# Patient Record
Sex: FEMALE | Race: WHITE | Employment: FULL TIME | URBAN - METROPOLITAN AREA
[De-identification: names, ages, dates, MRNs, and addresses within clinical notes are randomized per-mention and may not be internally consistent; named-entity substitution may affect disease eponyms.]

---

## 2022-10-06 ENCOUNTER — APPOINTMENT (OUTPATIENT)
Dept: GENERAL RADIOLOGY | Age: 57
End: 2022-10-06
Payer: COMMERCIAL

## 2022-10-06 ENCOUNTER — APPOINTMENT (OUTPATIENT)
Dept: MRI IMAGING | Age: 57
End: 2022-10-06
Payer: COMMERCIAL

## 2022-10-06 ENCOUNTER — APPOINTMENT (OUTPATIENT)
Dept: CT IMAGING | Age: 57
End: 2022-10-06
Payer: COMMERCIAL

## 2022-10-06 ENCOUNTER — HOSPITAL ENCOUNTER (OUTPATIENT)
Age: 57
Setting detail: OBSERVATION
Discharge: HOME | End: 2022-10-07
Attending: EMERGENCY MEDICINE | Admitting: STUDENT IN AN ORGANIZED HEALTH CARE EDUCATION/TRAINING PROGRAM
Payer: COMMERCIAL

## 2022-10-06 DIAGNOSIS — H54.7 VISUAL LOSS: Primary | ICD-10-CM

## 2022-10-06 DIAGNOSIS — G45.9 TIA (TRANSIENT ISCHEMIC ATTACK): ICD-10-CM

## 2022-10-06 PROBLEM — F41.9 ANXIETY: Status: ACTIVE | Noted: 2019-03-14

## 2022-10-06 PROBLEM — I10 ESSENTIAL HYPERTENSION: Status: ACTIVE | Noted: 2019-03-14

## 2022-10-06 PROBLEM — E03.9 ACQUIRED HYPOTHYROIDISM: Status: ACTIVE | Noted: 2019-03-14

## 2022-10-06 LAB
ABSOLUTE EOS #: 0.2 K/UL (ref 0–0.4)
ABSOLUTE LYMPH #: 1.9 K/UL (ref 1–4.8)
ABSOLUTE MONO #: 0.5 K/UL (ref 0.1–1.2)
ANION GAP SERPL CALCULATED.3IONS-SCNC: 12 MMOL/L (ref 9–17)
BASOPHILS # BLD: 2 % (ref 0–2)
BASOPHILS ABSOLUTE: 0.1 K/UL (ref 0–0.2)
BUN BLDV-MCNC: 18 MG/DL (ref 6–20)
CALCIUM SERPL-MCNC: 9.9 MG/DL (ref 8.6–10.4)
CHLORIDE BLD-SCNC: 98 MMOL/L (ref 98–107)
CO2: 28 MMOL/L (ref 20–31)
CREAT SERPL-MCNC: 0.69 MG/DL (ref 0.5–0.9)
EOSINOPHILS RELATIVE PERCENT: 3 % (ref 1–4)
GFR SERPL CREATININE-BSD FRML MDRD: >60 ML/MIN/1.73M2
GLUCOSE BLD-MCNC: 86 MG/DL (ref 65–105)
GLUCOSE BLD-MCNC: 93 MG/DL (ref 70–99)
HCT VFR BLD CALC: 41.8 % (ref 36–46)
HEMOGLOBIN: 13.9 G/DL (ref 12–16)
LYMPHOCYTES # BLD: 26 % (ref 24–44)
MCH RBC QN AUTO: 29.1 PG (ref 26–34)
MCHC RBC AUTO-ENTMCNC: 33.1 G/DL (ref 31–37)
MCV RBC AUTO: 87.9 FL (ref 80–100)
MONOCYTES # BLD: 7 % (ref 2–11)
PDW BLD-RTO: 12.5 % (ref 12.5–15.4)
PLATELET # BLD: 252 K/UL (ref 140–450)
PMV BLD AUTO: 9.5 FL (ref 6–12)
POTASSIUM SERPL-SCNC: 4.1 MMOL/L (ref 3.7–5.3)
RBC # BLD: 4.76 M/UL (ref 4–5.2)
SEG NEUTROPHILS: 62 % (ref 36–66)
SEGMENTED NEUTROPHILS ABSOLUTE COUNT: 4.6 K/UL (ref 1.8–7.7)
SODIUM BLD-SCNC: 138 MMOL/L (ref 135–144)
WBC # BLD: 7.4 K/UL (ref 3.5–11)

## 2022-10-06 PROCEDURE — G0378 HOSPITAL OBSERVATION PER HR: HCPCS

## 2022-10-06 PROCEDURE — 82947 ASSAY GLUCOSE BLOOD QUANT: CPT

## 2022-10-06 PROCEDURE — 2580000003 HC RX 258: Performed by: EMERGENCY MEDICINE

## 2022-10-06 PROCEDURE — 36415 COLL VENOUS BLD VENIPUNCTURE: CPT

## 2022-10-06 PROCEDURE — 6370000000 HC RX 637 (ALT 250 FOR IP): Performed by: STUDENT IN AN ORGANIZED HEALTH CARE EDUCATION/TRAINING PROGRAM

## 2022-10-06 PROCEDURE — 70450 CT HEAD/BRAIN W/O DYE: CPT

## 2022-10-06 PROCEDURE — 2580000003 HC RX 258: Performed by: STUDENT IN AN ORGANIZED HEALTH CARE EDUCATION/TRAINING PROGRAM

## 2022-10-06 PROCEDURE — 93005 ELECTROCARDIOGRAM TRACING: CPT | Performed by: EMERGENCY MEDICINE

## 2022-10-06 PROCEDURE — 80048 BASIC METABOLIC PNL TOTAL CA: CPT

## 2022-10-06 PROCEDURE — 96372 THER/PROPH/DIAG INJ SC/IM: CPT

## 2022-10-06 PROCEDURE — 6360000002 HC RX W HCPCS: Performed by: STUDENT IN AN ORGANIZED HEALTH CARE EDUCATION/TRAINING PROGRAM

## 2022-10-06 PROCEDURE — 99285 EMERGENCY DEPT VISIT HI MDM: CPT

## 2022-10-06 PROCEDURE — 99219 PR INITIAL OBSERVATION CARE/DAY 50 MINUTES: CPT | Performed by: STUDENT IN AN ORGANIZED HEALTH CARE EDUCATION/TRAINING PROGRAM

## 2022-10-06 PROCEDURE — 71045 X-RAY EXAM CHEST 1 VIEW: CPT

## 2022-10-06 PROCEDURE — 6370000000 HC RX 637 (ALT 250 FOR IP): Performed by: EMERGENCY MEDICINE

## 2022-10-06 PROCEDURE — 85025 COMPLETE CBC W/AUTO DIFF WBC: CPT

## 2022-10-06 PROCEDURE — 70496 CT ANGIOGRAPHY HEAD: CPT

## 2022-10-06 PROCEDURE — 6360000004 HC RX CONTRAST MEDICATION: Performed by: EMERGENCY MEDICINE

## 2022-10-06 PROCEDURE — 70551 MRI BRAIN STEM W/O DYE: CPT

## 2022-10-06 RX ORDER — LEVOTHYROXINE SODIUM 0.12 MG/1
125 TABLET ORAL DAILY
Status: DISCONTINUED | OUTPATIENT
Start: 2022-10-07 | End: 2022-10-07 | Stop reason: HOSPADM

## 2022-10-06 RX ORDER — LOSARTAN POTASSIUM 50 MG/1
100 TABLET ORAL DAILY
Status: DISCONTINUED | OUTPATIENT
Start: 2022-10-07 | End: 2022-10-07 | Stop reason: HOSPADM

## 2022-10-06 RX ORDER — SODIUM CHLORIDE 9 MG/ML
INJECTION, SOLUTION INTRAVENOUS PRN
Status: DISCONTINUED | OUTPATIENT
Start: 2022-10-06 | End: 2022-10-07 | Stop reason: HOSPADM

## 2022-10-06 RX ORDER — ASPIRIN 81 MG/1
324 TABLET, CHEWABLE ORAL ONCE
Status: COMPLETED | OUTPATIENT
Start: 2022-10-06 | End: 2022-10-06

## 2022-10-06 RX ORDER — ACETAMINOPHEN 325 MG/1
650 TABLET ORAL EVERY 6 HOURS PRN
Status: DISCONTINUED | OUTPATIENT
Start: 2022-10-06 | End: 2022-10-07 | Stop reason: HOSPADM

## 2022-10-06 RX ORDER — LEVOTHYROXINE SODIUM 0.12 MG/1
125 TABLET ORAL DAILY
COMMUNITY

## 2022-10-06 RX ORDER — ATORVASTATIN CALCIUM 40 MG/1
40 TABLET, FILM COATED ORAL NIGHTLY
Status: DISCONTINUED | OUTPATIENT
Start: 2022-10-06 | End: 2022-10-07 | Stop reason: HOSPADM

## 2022-10-06 RX ORDER — LOSARTAN POTASSIUM AND HYDROCHLOROTHIAZIDE 25; 100 MG/1; MG/1
1 TABLET ORAL DAILY
COMMUNITY

## 2022-10-06 RX ORDER — POLYETHYLENE GLYCOL 3350 17 G/17G
17 POWDER, FOR SOLUTION ORAL DAILY PRN
Status: DISCONTINUED | OUTPATIENT
Start: 2022-10-06 | End: 2022-10-07 | Stop reason: HOSPADM

## 2022-10-06 RX ORDER — ONDANSETRON 2 MG/ML
4 INJECTION INTRAMUSCULAR; INTRAVENOUS EVERY 6 HOURS PRN
Status: DISCONTINUED | OUTPATIENT
Start: 2022-10-06 | End: 2022-10-07 | Stop reason: HOSPADM

## 2022-10-06 RX ORDER — ONDANSETRON 4 MG/1
4 TABLET, ORALLY DISINTEGRATING ORAL EVERY 8 HOURS PRN
Status: DISCONTINUED | OUTPATIENT
Start: 2022-10-06 | End: 2022-10-07 | Stop reason: HOSPADM

## 2022-10-06 RX ORDER — SODIUM CHLORIDE 0.9 % (FLUSH) 0.9 %
10 SYRINGE (ML) INJECTION PRN
Status: DISCONTINUED | OUTPATIENT
Start: 2022-10-06 | End: 2022-10-07 | Stop reason: HOSPADM

## 2022-10-06 RX ORDER — LOSARTAN POTASSIUM 50 MG/1
100 TABLET ORAL DAILY
Status: DISCONTINUED | OUTPATIENT
Start: 2022-10-07 | End: 2022-10-06

## 2022-10-06 RX ORDER — LOSARTAN POTASSIUM 100 MG/1
TABLET ORAL DAILY
Status: ON HOLD | COMMUNITY
End: 2022-10-06

## 2022-10-06 RX ORDER — CLOPIDOGREL BISULFATE 75 MG/1
75 TABLET ORAL DAILY
Status: DISCONTINUED | OUTPATIENT
Start: 2022-10-06 | End: 2022-10-07 | Stop reason: HOSPADM

## 2022-10-06 RX ORDER — HYDROCHLOROTHIAZIDE 25 MG/1
25 TABLET ORAL DAILY
Status: DISCONTINUED | OUTPATIENT
Start: 2022-10-07 | End: 2022-10-07 | Stop reason: HOSPADM

## 2022-10-06 RX ORDER — 0.9 % SODIUM CHLORIDE 0.9 %
80 INTRAVENOUS SOLUTION INTRAVENOUS ONCE
Status: DISCONTINUED | OUTPATIENT
Start: 2022-10-06 | End: 2022-10-07 | Stop reason: HOSPADM

## 2022-10-06 RX ORDER — SODIUM CHLORIDE 0.9 % (FLUSH) 0.9 %
5-40 SYRINGE (ML) INJECTION EVERY 12 HOURS SCHEDULED
Status: DISCONTINUED | OUTPATIENT
Start: 2022-10-06 | End: 2022-10-07 | Stop reason: HOSPADM

## 2022-10-06 RX ORDER — ENOXAPARIN SODIUM 100 MG/ML
40 INJECTION SUBCUTANEOUS DAILY
Status: DISCONTINUED | OUTPATIENT
Start: 2022-10-06 | End: 2022-10-07 | Stop reason: HOSPADM

## 2022-10-06 RX ORDER — SODIUM CHLORIDE 0.9 % (FLUSH) 0.9 %
5-40 SYRINGE (ML) INJECTION PRN
Status: DISCONTINUED | OUTPATIENT
Start: 2022-10-06 | End: 2022-10-07 | Stop reason: HOSPADM

## 2022-10-06 RX ADMIN — ATORVASTATIN CALCIUM 40 MG: 40 TABLET, FILM COATED ORAL at 21:51

## 2022-10-06 RX ADMIN — ENOXAPARIN SODIUM 40 MG: 100 INJECTION SUBCUTANEOUS at 15:29

## 2022-10-06 RX ADMIN — SODIUM CHLORIDE, PRESERVATIVE FREE 10 ML: 5 INJECTION INTRAVENOUS at 12:53

## 2022-10-06 RX ADMIN — SODIUM CHLORIDE, PRESERVATIVE FREE 10 ML: 5 INJECTION INTRAVENOUS at 21:51

## 2022-10-06 RX ADMIN — Medication 80 ML: at 12:54

## 2022-10-06 RX ADMIN — ASPIRIN 81 MG CHEWABLE TABLET 324 MG: 81 TABLET CHEWABLE at 14:36

## 2022-10-06 RX ADMIN — IOPAMIDOL 100 ML: 755 INJECTION, SOLUTION INTRAVENOUS at 12:53

## 2022-10-06 RX ADMIN — CLOPIDOGREL BISULFATE 75 MG: 75 TABLET ORAL at 15:29

## 2022-10-06 ASSESSMENT — PAIN DESCRIPTION - LOCATION: LOCATION: JAW

## 2022-10-06 ASSESSMENT — PAIN SCALES - GENERAL
PAINLEVEL_OUTOF10: 0
PAINLEVEL_OUTOF10: 0

## 2022-10-06 ASSESSMENT — PAIN - FUNCTIONAL ASSESSMENT: PAIN_FUNCTIONAL_ASSESSMENT: 0-10

## 2022-10-06 NOTE — ED TRIAGE NOTES
Vision issues started this morning. Called her brother who is optometrist and told her she could be having a stroke. Vision better at this time.

## 2022-10-06 NOTE — H&P
[facility]  Office: [address], DO, Tereso Staples, DO, Amara Cope, DO, [place] Matsgerri Blood, DO, Velton Habermann, MD, Dianna Lauren MD, Mikhail Costa MD, Marybeth Wynne MD,  Nalini Hein MD, Raymond Dewitt MD, Harmony Sanders, DO, Gracie Resendiz MD,  Micah Shah MD, Jade Fleming MD, Kandy Souza DO, Madhu Nicolas MD, Bertha Fraser MD, Astrid Gabriel MD, Kenny Bustillo MD, Samantha Barfield MD, Declan Machuca MD, Cas Light, DO, Bessy Manrique MD, Fish Grullon MD, Kadeem Carvalho, CNP,  Namita Swanson, CNP, Emely Alvarado, CNP, Jannette Hernandez, CNP,  Nelida Olsen, DNP, Nora Ward, CNP, Benjamin Mabry, CNP, Castro Coe, CNP, Mary Iniguez, CNP, Joanna Marie, CNP, Jalen Brown, PA-C, Dinora Aparicio, CNS, Juju Marie, DNP, Toni Drew, CNP, Franklin Landeros, CNP, Ting Garcia, [address]    HISTORY AND PHYSICAL EXAMINATION            Date:   10/6/2022  Patient name:  Ariel Frank  Date of admission:  10/6/2022 11:27 AM  MRN:   [id number]  Account:  [de-identified]  YOB: 1965  PCP:    No primary care provider on file. Room:   [address] [place] [address]  Code Status:    Full Code    Chief Complaint:     Visual disturbance     History Obtained From:     patient    History of Present Illness:     Ariel Frank is a 56 y.o. female with a past medical history of hypothyroidism and hypertension who presented to the emergency department on 10/6/2022 complaining of transient loss of vision in her left eye. The patient states that her symptoms began early this morning and resolved in the emergency department. The patient states that she started \"seeing grey dots\" in her left periphery this morning and then suddenly lost her peripheral vision in the affected eye. In the ED, the patient was afebrile and nontoxic appearing.  CT and CTA of the head and neck were negative for acute stroke or vascular abnormality. Neurology was consulted in the emergency department and recommended admission for MRI of the brain and inpatient neurology evaluation. The patient is admitted to internal medicine for further management. Past Medical History:     Past Medical History:   Diagnosis Date    Hypertension         Past Surgical History:     Past Surgical History:   Procedure Laterality Date    THYROID SURGERY          Medications Prior to Admission:     Prior to Admission medications    Medication Sig Start Date End Date Taking? Authorizing Provider   levothyroxine (SYNTHROID) 125 MCG tablet Take 125 mcg by mouth Daily   Yes Historical Provider, MD   losartan-hydroCHLOROthiazide (HYZAAR) 100-25 MG per tablet Take 1 tablet by mouth daily   Yes Historical Provider, MD        Allergies:     Patient has no known allergies. Social History:     Tobacco:    has no history on file for tobacco use. Alcohol:      has no history on file for alcohol use. Drug Use:  has no history on file for drug use. Family History:     No family history on file. Review of Systems:     Positive and Negative as described in HPI.     CONSTITUTIONAL:  negative for fevers, chills, sweats, fatigue, weight loss  HEENT:  negative for vision, hearing changes, runny nose, throat pain  RESPIRATORY:  negative for shortness of breath, cough, congestion, wheezing  CARDIOVASCULAR:  negative for chest pain, palpitations  GASTROINTESTINAL:  negative for nausea, vomiting, diarrhea, constipation, change in bowel habits, abdominal pain   GENITOURINARY:  negative for difficulty of urination, burning with urination, frequency   INTEGUMENT:  negative for rash, skin lesions, easy bruising   HEMATOLOGIC/LYMPHATIC:  negative for swelling/edema   ALLERGIC/IMMUNOLOGIC:  negative for urticaria , itching  ENDOCRINE:  negative increase in drinking, increase in urination, hot or cold intolerance  MUSCULOSKELETAL:  negative joint pains, muscle aches, swelling of joints  NEUROLOGICAL:  Positive for transient loss of vision in left eye.    BEHAVIOR/PSYCH:  negative for depression, anxiety    Physical Exam:   /86   Pulse 78   Temp 98.2 °F (36.8 °C) (Oral)   Resp 12   Ht 5' 6\" (1.676 m)   Wt 215 lb (97.5 kg)   SpO2 99%   BMI 34.70 kg/m²   Temp (24hrs), Av.2 °F (36.8 °C), Min:98.2 °F (36.8 °C), Max:98.2 °F (36.8 °C)    Recent Labs     10/06/22  1139   POCGLU 86     No intake or output data in the 24 hours ending 10/06/22 1531    General Appearance:  alert, well appearing, and in no acute distress  Mental status: oriented to person, place, and time  Head:  normocephalic, atraumatic  Eye: no icterus, redness, pupils equal and reactive, extraocular eye movements intact, conjunctiva clear  Ear: normal external ear, no discharge, hearing intact  Nose:  no drainage noted  Mouth: mucous membranes moist  Neck: supple, no carotid bruits, thyroid not palpable  Lungs: Bilateral equal air entry, clear to ausculation, no wheezing, rales or rhonchi, normal effort  Cardiovascular: normal rate, regular rhythm, no murmur, gallop, rub  Abdomen: Soft, nontender, nondistended, normal bowel sounds, no hepatomegaly or splenomegaly  Neurologic: There are no new focal motor or sensory deficits, normal muscle tone and bulk, no abnormal sensation, normal speech, cranial nerves II through XII grossly intact  Skin: No gross lesions, rashes, bruising or bleeding on exposed skin area  Extremities:  peripheral pulses palpable, no pedal edema or calf pain with palpation  Psych: normal affect     Investigations:      Laboratory Testing:  Recent Results (from the past 24 hour(s))   POC Glucose Fingerstick    Collection Time: 10/06/22 11:39 AM   Result Value Ref Range    POC Glucose 86 65 - 105 mg/dL   CBC with Auto Differential    Collection Time: 10/06/22 11:55 AM   Result Value Ref Range    WBC 7.4 3.5 - 11.0 k/uL    RBC 4.76 4.0 - 5.2 m/uL    Hemoglobin 13.9 12.0 - 16.0 g/dL    Hematocrit 41.8 36 - 46 %    MCV 87.9 80 - 100 fL    MCH 29.1 26 - 34 pg    MCHC 33.1 31 - 37 g/dL    RDW 12.5 12.5 - 15.4 %    Platelets 120 396 - 104 k/uL    MPV 9.5 6.0 - 12.0 fL    Seg Neutrophils 62 36 - 66 %    Lymphocytes 26 24 - 44 %    Monocytes 7 2 - 11 %    Eosinophils % 3 1 - 4 %    Basophils 2 0 - 2 %    Segs Absolute 4.60 1.8 - 7.7 k/uL    Absolute Lymph # 1.90 1.0 - 4.8 k/uL    Absolute Mono # 0.50 0.1 - 1.2 k/uL    Absolute Eos # 0.20 0.0 - 0.4 k/uL    Basophils Absolute 0.10 0.0 - 0.2 k/uL   Basic Metabolic Panel    Collection Time: 10/06/22 11:55 AM   Result Value Ref Range    Glucose 93 70 - 99 mg/dL    BUN 18 6 - 20 mg/dL    Creatinine 0.69 0.50 - 0.90 mg/dL    Est, Glom Filt Rate >60 >60 mL/min/1.73m2    Calcium 9.9 8.6 - 10.4 mg/dL    Sodium 138 135 - 144 mmol/L    Potassium 4.1 3.7 - 5.3 mmol/L    Chloride 98 98 - 107 mmol/L    CO2 28 20 - 31 mmol/L    Anion Gap 12 9 - 17 mmol/L       Imaging/Diagnostics:  CT HEAD WO CONTRAST    Addendum Date: 10/6/2022    ADDENDUM: Findings were discussed with Yoli Og at 12:00 pm on 10/6/2022. Result Date: 10/6/2022  No acute intracranial abnormality. Findings were sent to [address] [place] [address] at 11:56 am on 10/6/2022. XR CHEST PORTABLE    Result Date: 10/6/2022  No acute intrathoracic process. CTA HEAD NECK W CONTRAST    Result Date: 10/6/2022  Unremarkable CTA of the head and neck.        Assessment :      Hospital Problems             Last Modified POA    * (Principal) TIA (transient ischemic attack) 10/6/2022 Yes    Acquired hypothyroidism 10/6/2022 Yes    Essential hypertension 10/6/2022 Yes       Plan:     Patient status observation in the Med/Surge    Transient visual disturbance   -Likely secondary to TIA   -CT and CTA head and neck negative   -MRI of the brain is pending   -Start aspirin, atorvastatin and clopidogrel     Hypothyroidism   -Continue home levothyroxine     Hypertension   -Continue home Hyzaar     Consultations:   IP CONSULT TO Nick Philippe MD  10/6/2022  3:31 PM    Copy sent to Dr. Dewey primary care provider on file.

## 2022-10-06 NOTE — PROGRESS NOTES
.Patient admitted to room 345. VS taken, telemetry placed and call light given/within reach. Patient A&O and given room orientation. Orders released/reviewed, initial assessment completed and navigator started. See chart for more detail.

## 2022-10-06 NOTE — ED PROVIDER NOTES
[address] [facility]  [address] [place] [facility] [address]  Phone: [phone]  Fax: Sandra Saucedo 8636      Pt Name: Lara Chandler  MRN: [id number]  [place] 1965  Date of evaluation: 10/6/2022    CHIEF COMPLAINT     Visual changes. HISTORY OF PRESENT ILLNESS    Lara Chandler is a 56 y.o. female who presents to the emergency department with left-sided blurred vision/visual loss. Patient states that she woke up at 7:00 this morning was doing fine and shortly after went into her office turned on the light and had sudden onset of left-sided visual loss. The lateral aspect of her vision on the left eye was completely black. Over the course of the next couple of hours the symptoms improved and changed from visual loss to looking through a gray mesh to a gray streak all of which has now resolved. Denies any extremity weakness or numbness. No other symptoms. No headache. She does wear contacts and glasses. REVIEW OF SYSTEMS       Constitutional: No fevers or chills   HEENT: No sore throat, rhinorrhea, or earache   Eyes: Visual loss resolved  Cardiovascular: No chest pain or tachycardia   Respiratory: No wheezing or shortness of breath no cough   Gastrointestinal: No nausea, vomiting, diarrhea, constipation, or abdominal pain   : No hematuria or dysuria   Musculoskeletal: No swelling or pain   Skin: No rash   Neurological: Positive visual loss resolved    PAST MEDICAL HISTORY    has a past medical history of Hypertension. SURGICAL HISTORY      has a past surgical history that includes Thyroid surgery. CURRENT MEDICATIONS       Previous Medications    LEVOTHYROXINE (SYNTHROID) 125 MCG TABLET    Take 125 mcg by mouth Daily    LOSARTAN (COZAAR) 100 MG TABLET    Take by mouth daily       ALLERGIES     has No Known Allergies. FAMILY HISTORY     has no family status information on file. family history is not on file.     SOCIAL HISTORY          PHYSICAL EXAM ED Triage Vitals   BP Temp Temp src Heart Rate Resp SpO2 Height Weight   10/06/22 1134 -- -- 10/06/22 1140 10/06/22 1140 10/06/22 1140 -- --   (!) 157/108   94 18 95 %         Constitutional: Alert, oriented x3, nontoxic, answering questions appropriately, acting properly for age, in no acute distress   HEENT: Extraocular muscles intact, mucus membranes moist,no posterior pharyngeal erythema or exudates, Pupils equal, round, reactive to light,   Neck: Trachea midline   Cardiovascular: Regular rhythm and rate no murmurs   Respiratory: Clear to auscultation bilaterally no wheezes, rhonchi, rales, no respiratory distress no tachypnea no retractions no hypoxia  Gastrointestinal: Soft, nontender, nondistended, positive bowel sounds. No rebound, rigidity, or guarding. Musculoskeletal: No extremity pain or swelling   Neurologic: Moving all 4 extremities without difficulty there are no gross focal neurologic deficits   Skin: Warm and dry       DIFFERENTIAL DIAGNOSIS/ MDM:         DIAGNOSTIC RESULTS     EKG: All EKG's are interpreted by the Emergency Department Physician who either signs or Co-signs this chart in the absence of a cardiologist.      1214 sinus rhythm rate 73  QRS 88  no acute ST or T wave changes.     Not indicated unless otherwise documented above    LABS:  Results for orders placed or performed during the hospital encounter of 10/06/22   CBC with Auto Differential   Result Value Ref Range    WBC 7.4 3.5 - 11.0 k/uL    RBC 4.76 4.0 - 5.2 m/uL    Hemoglobin 13.9 12.0 - 16.0 g/dL    Hematocrit 41.8 36 - 46 %    MCV 87.9 80 - 100 fL    MCH 29.1 26 - 34 pg    MCHC 33.1 31 - 37 g/dL    RDW 12.5 12.5 - 15.4 %    Platelets 372 758 - 699 k/uL    MPV 9.5 6.0 - 12.0 fL    Seg Neutrophils 62 36 - 66 %    Lymphocytes 26 24 - 44 %    Monocytes 7 2 - 11 %    Eosinophils % 3 1 - 4 %    Basophils 2 0 - 2 %    Segs Absolute 4.60 1.8 - 7.7 k/uL    Absolute Lymph # 1.90 1.0 - 4.8 k/uL    Absolute Mono # 0.50 0.1 - 1.2 k/uL    Absolute Eos # 0.20 0.0 - 0.4 k/uL    Basophils Absolute 0.10 0.0 - 0.2 k/uL   Basic Metabolic Panel   Result Value Ref Range    Glucose 93 70 - 99 mg/dL    BUN 18 6 - 20 mg/dL    Creatinine 0.69 0.50 - 0.90 mg/dL    Est, Glom Filt Rate >60 >60 mL/min/1.73m2    Calcium 9.9 8.6 - 10.4 mg/dL    Sodium 138 135 - 144 mmol/L    Potassium 4.1 3.7 - 5.3 mmol/L    Chloride 98 98 - 107 mmol/L    CO2 28 20 - 31 mmol/L    Anion Gap 12 9 - 17 mmol/L   POC Glucose Fingerstick   Result Value Ref Range    POC Glucose 86 65 - 105 mg/dL       Not indicated unless otherwise documented above    RADIOLOGY:   I reviewed the radiologist interpretations:    CTA HEAD NECK W CONTRAST   Final Result   Unremarkable CTA of the head and neck. XR CHEST PORTABLE   Final Result   No acute intrathoracic process. CT HEAD WO CONTRAST   Final Result   Addendum (preliminary) 1 of 1   ADDENDUM:   Findings were discussed with Yoli Og at 12:00 pm on 10/6/2022. Final   No acute intracranial abnormality. Findings were sent to [address] [place] [address] at 11:56 am on 10/6/2022. Not indicated unless otherwise documented above    EMERGENCY DEPARTMENT COURSE:     The patient was given the following medications:  Orders Placed This Encounter   Medications    0.9 % sodium chloride bolus    iopamidol (ISOVUE-370) 76 % injection 100 mL    sodium chloride flush 0.9 % injection 10 mL    aspirin chewable tablet 324 mg        Vitals:   -------------------------  /71   Pulse 75   Resp 16   Ht 5' 6\" (1.676 m)   Wt 97.5 kg (215 lb)   SpO2 98%   BMI 34.70 kg/m²     2:05 PM summary CT head unremarkable. CTA head and neck unremarkable. Given aspirin. Discussed with Dr. Lizbeth Rodriguze at 1:45 PM who recommended admission for MRI and neurology eval.  Patient without any symptoms currently. Case was discussed through [facility] with Dr. Jessy Odonnell who agrees to admission.       CRITICAL CARE:    None    CONSULTS:    None    PROCEDURES:    None      OARRS Report if indicated             FINAL IMPRESSION      1. Visual loss    2. TIA (transient ischemic attack)          DISPOSITION/PLAN   DISPOSITION Admitted 10/06/2022 02:08:03 PM        CONDITION ON DISPOSITION: STABLE       PATIENT REFERRED TO:  No follow-up provider specified.     DISCHARGE MEDICATIONS:  New Prescriptions    No medications on file       (Please note that portions of this note were completed with a voice recognition program.  Efforts were made to edit the dictations but occasionally words are mis-transcribed.)    Nora Gupta DO   Attending Emergency Physician     Nora Gupta DO  10/06/22 1412

## 2022-10-07 VITALS
SYSTOLIC BLOOD PRESSURE: 149 MMHG | HEIGHT: 66 IN | BODY MASS INDEX: 35.08 KG/M2 | TEMPERATURE: 99 F | WEIGHT: 218.26 LBS | RESPIRATION RATE: 16 BRPM | DIASTOLIC BLOOD PRESSURE: 87 MMHG | OXYGEN SATURATION: 94 % | HEART RATE: 71 BPM

## 2022-10-07 PROBLEM — G43.109 OCULAR MIGRAINE: Status: ACTIVE | Noted: 2022-10-07

## 2022-10-07 PROBLEM — H54.7 VISUAL LOSS: Status: ACTIVE | Noted: 2022-10-07

## 2022-10-07 LAB
ANION GAP SERPL CALCULATED.3IONS-SCNC: 10 MMOL/L (ref 9–17)
BUN BLDV-MCNC: 17 MG/DL (ref 6–20)
C-REACTIVE PROTEIN: <3 MG/L (ref 0–5)
CALCIUM SERPL-MCNC: 9.3 MG/DL (ref 8.6–10.4)
CHLORIDE BLD-SCNC: 101 MMOL/L (ref 98–107)
CO2: 28 MMOL/L (ref 20–31)
CREAT SERPL-MCNC: 0.81 MG/DL (ref 0.5–0.9)
GFR SERPL CREATININE-BSD FRML MDRD: >60 ML/MIN/1.73M2
GLUCOSE BLD-MCNC: 115 MG/DL (ref 70–99)
POTASSIUM SERPL-SCNC: 3.8 MMOL/L (ref 3.7–5.3)
SEDIMENTATION RATE, ERYTHROCYTE: 25 MM/HR (ref 0–30)
SODIUM BLD-SCNC: 139 MMOL/L (ref 135–144)
TSH SERPL DL<=0.05 MIU/L-ACNC: 0.5 UIU/ML (ref 0.3–5)

## 2022-10-07 PROCEDURE — 80061 LIPID PANEL: CPT

## 2022-10-07 PROCEDURE — 96372 THER/PROPH/DIAG INJ SC/IM: CPT

## 2022-10-07 PROCEDURE — 6370000000 HC RX 637 (ALT 250 FOR IP): Performed by: STUDENT IN AN ORGANIZED HEALTH CARE EDUCATION/TRAINING PROGRAM

## 2022-10-07 PROCEDURE — 85652 RBC SED RATE AUTOMATED: CPT

## 2022-10-07 PROCEDURE — 36415 COLL VENOUS BLD VENIPUNCTURE: CPT

## 2022-10-07 PROCEDURE — 86140 C-REACTIVE PROTEIN: CPT

## 2022-10-07 PROCEDURE — 2580000003 HC RX 258: Performed by: STUDENT IN AN ORGANIZED HEALTH CARE EDUCATION/TRAINING PROGRAM

## 2022-10-07 PROCEDURE — G0378 HOSPITAL OBSERVATION PER HR: HCPCS

## 2022-10-07 PROCEDURE — 6360000002 HC RX W HCPCS: Performed by: STUDENT IN AN ORGANIZED HEALTH CARE EDUCATION/TRAINING PROGRAM

## 2022-10-07 PROCEDURE — 99217 PR OBSERVATION CARE DISCHARGE MANAGEMENT: CPT | Performed by: STUDENT IN AN ORGANIZED HEALTH CARE EDUCATION/TRAINING PROGRAM

## 2022-10-07 PROCEDURE — 84443 ASSAY THYROID STIM HORMONE: CPT

## 2022-10-07 PROCEDURE — 83036 HEMOGLOBIN GLYCOSYLATED A1C: CPT

## 2022-10-07 PROCEDURE — 99223 1ST HOSP IP/OBS HIGH 75: CPT | Performed by: PSYCHIATRY & NEUROLOGY

## 2022-10-07 PROCEDURE — 80048 BASIC METABOLIC PNL TOTAL CA: CPT

## 2022-10-07 RX ORDER — CLOPIDOGREL BISULFATE 75 MG/1
75 TABLET ORAL DAILY
Qty: 21 TABLET | Refills: 0 | Status: SHIPPED | OUTPATIENT
Start: 2022-10-07 | End: 2022-10-28

## 2022-10-07 RX ORDER — ATORVASTATIN CALCIUM 40 MG/1
40 TABLET, FILM COATED ORAL NIGHTLY
Qty: 30 TABLET | Refills: 3 | Status: SHIPPED | OUTPATIENT
Start: 2022-10-07

## 2022-10-07 RX ORDER — ASPIRIN 81 MG/1
81 TABLET ORAL DAILY
Qty: 21 TABLET | Refills: 0 | Status: SHIPPED | OUTPATIENT
Start: 2022-10-07 | End: 2022-10-28

## 2022-10-07 RX ORDER — CLOPIDOGREL BISULFATE 75 MG/1
75 TABLET ORAL DAILY
Qty: 21 TABLET | Refills: 0 | Status: SHIPPED | OUTPATIENT
Start: 2022-10-07 | End: 2022-10-07 | Stop reason: HOSPADM

## 2022-10-07 RX ORDER — ASPIRIN 81 MG/1
81 TABLET ORAL DAILY
Qty: 21 TABLET | Refills: 0 | Status: SHIPPED | OUTPATIENT
Start: 2022-10-07 | End: 2022-10-07 | Stop reason: HOSPADM

## 2022-10-07 RX ADMIN — CLOPIDOGREL BISULFATE 75 MG: 75 TABLET ORAL at 08:51

## 2022-10-07 RX ADMIN — LOSARTAN POTASSIUM 100 MG: 50 TABLET, FILM COATED ORAL at 08:51

## 2022-10-07 RX ADMIN — HYDROCHLOROTHIAZIDE 25 MG: 25 TABLET ORAL at 08:51

## 2022-10-07 RX ADMIN — ASPIRIN 325 MG: 325 TABLET, COATED ORAL at 08:51

## 2022-10-07 RX ADMIN — SODIUM CHLORIDE, PRESERVATIVE FREE 10 ML: 5 INJECTION INTRAVENOUS at 08:51

## 2022-10-07 RX ADMIN — LEVOTHYROXINE SODIUM 125 MCG: 0.12 TABLET ORAL at 06:51

## 2022-10-07 RX ADMIN — ENOXAPARIN SODIUM 40 MG: 100 INJECTION SUBCUTANEOUS at 08:51

## 2022-10-07 ASSESSMENT — PAIN SCALES - GENERAL: PAINLEVEL_OUTOF10: 0

## 2022-10-07 NOTE — VIRTUAL HEALTH
[address] Stroke and Vascular Neurology Consult for  Hannah webster [facility] ED Stroke Alert through [address] @ 13:25  10/6/2022 2:54 PM  Pt Name: Titi Umanzor  MRN: [id number]  YOB: 1965  Date of evaluation: 10/6/2022  Primary Care Physician: No primary care provider on file. Reason for Evaluation: Stroke Evaluation with Discussion with Ed or primary team with Telemedicine and stroke evaluation with Review of imaging and labs    Titi Umanzor is a 56 y.o. female who presents with history of hypothyroidism and htn with concern of initial left blurred vision and vision loss when she awoke 7 am lasting 2 hrs. Described as going black. Improving to gray mesh. No prior episodes, no headach. es    LKW: last night  NIH:  0    Allergies  has No Known Allergies. Medications  Prior to Admission medications    Medication Sig Start Date End Date Taking? Authorizing Provider   levothyroxine (SYNTHROID) 125 MCG tablet Take 125 mcg by mouth Daily   Yes Historical Provider, MD   losartan-hydroCHLOROthiazide (HYZAAR) 100-25 MG per tablet Take 1 tablet by mouth daily   Yes Historical Provider, MD    Scheduled Meds:   sodium chloride  80 mL IntraVENous Once    [START ON 10/7/2022] levothyroxine  125 mcg Oral Daily    [START ON 10/7/2022] aspirin  325 mg Oral Daily    clopidogrel  75 mg Oral Daily    sodium chloride flush  5-40 mL IntraVENous 2 times per day    enoxaparin  40 mg SubCUTAneous Daily    atorvastatin  40 mg Oral Nightly    [START ON 10/7/2022] losartan  100 mg Oral Daily    And    [START ON 10/7/2022] hydroCHLOROthiazide  25 mg Oral Daily     Continuous Infusions:   sodium chloride       PRN Meds:.sodium chloride flush, sodium chloride flush, sodium chloride, ondansetron **OR** ondansetron, polyethylene glycol, acetaminophen **OR** acetaminophen  Past Medical History   has a past medical history of Hypertension.   Social History  Social History     Socioeconomic History    Marital status:  Spouse name: Not on file    Number of children: Not on file    Years of education: Not on file    Highest education level: Not on file   Occupational History    Not on file   Tobacco Use    Smoking status: Unknown    Smokeless tobacco: Not on file   Substance and Sexual Activity    Alcohol use: Not on file    Drug use: Not on file    Sexual activity: Not on file   Other Topics Concern    Not on file   Social History Narrative    Not on file     Social Determinants of Health     Financial Resource Strain: Not on file   Food Insecurity: Not on file   Transportation Needs: Not on file   Physical Activity: Not on file   Stress: Not on file   Social Connections: Not on file   Intimate Partner Violence: Not on file   Housing Stability: Not on file     Family History  No family history on file. OBJECTIVE  /82   Pulse 70   Temp 97.9 °F (36.6 °C) (Oral)   Resp 16   Ht 5' 6\" (1.676 m)   Wt 215 lb (97.5 kg)   SpO2 95%   BMI 34.70 kg/m²     NIH Stroke Scale  Interval: Hand-off/Transfer  Level of Consciousness (1a): Alert  LOC Questions (1b): Answers both correctly  LOC Commands (1c): Performs both tasks correctly  Best Gaze (2): Normal  Visual (3): No visual loss  Facial Palsy (4): Normal symmetrical movement  Motor Arm, Left (5a): No drift  Motor Arm, Right (5b): No drift  Motor Leg, Left (6a): No drift  Motor Leg, Right (6b): No drift  Limb Ataxia (7): Absent  Sensory (8): Normal  Best Language (9): No aphasia  Dysarthria (10): Normal  Extinction and Inattention (11): No abnormality  Total: 0  Pre-Morbid mRS: 0    Imaging:  Images were personally reviewed with VIZ. AI and PACS used to review images including:  CT brain without contrast: no hemorrhage  CTA imaging: no large vessel occlusion  MRI brain: no acute stroke      Assessment    56 y.o. female who presents with history of hypothyroidism and htn with concern of initial left blurred vision and vision loss when she awoke 7 am lasting 2 hrs  Differential DDx:  Transient left visual vision loss with resolution no occipital stroke could have been retinal artery occlusion if monocular - no carotid stenosis      Recommendations:  NIH 0  Recommend Inpatient Neurology Consult for further assessment and evaluation   Aspirin  Lipid panel, a1c        Discussed with ED Physician    At least 50 min of Telemedicine and time in conversation directly with ED staff and physician for the patient who is in imminent and life threatening deterioration without further treatment and evaluation. This Virtual Visit was conducted with patient's (and/or legal guardian's) consent, to provide telestroke consultation and necessary medical care. Time spent examining patient, reviewing the images personally, reviewing the chart, perform high complexity decision making and speaking with the nursing staff regarding recommendations      Familia Sandoval MD, MD   Stroke, Neurocritical Care And/or [address] [place] [address] Stroke [address]  Electronically signed 10/6/2022 at 11:54 PM    Patient Location:  [address] [place] [address] Med Surg ICU    Provider Location ([facility]): [place], [place]    This virtual visit was conducted via interactive/real-time audio/video.

## 2022-10-07 NOTE — CARE COORDINATION
10/07/22 0851   Service Assessment   Patient Orientation Alert and Oriented   Cognition Alert   History Provided By Patient   Primary Caregiver Self   Support Systems Spouse/Significant Other;Family Members   Prior Functional Level Independent in ADLs/IADLs   Current Functional Level Independent in ADLs/IADLs   Can patient return to prior living arrangement Yes   Social/Functional History   ADL Assistance Independent   Homemaking Assistance Independent   Ambulation Assistance Independent   Transfer Assistance Independent   Discharge Planning   Type of Residence House   Living Arrangements Spouse/Significant Other   Current Services Prior To Admission None   Potential Assistance Needed N/A   Type of Home Care Services None   [place] [place] [facility]   Mode of Transport at Discharge Self   Confirm Follow Up Transport Family   Condition of Participation: Discharge Planning   The Patient and/or Patient Representative was provided with a Choice of Provider? Patient   The Patient and/Or Patient Representative agree with the Discharge Plan?  Yes       D/c home independent

## 2022-10-07 NOTE — PLAN OF CARE
Problem: Pain  Goal: Verbalizes/displays adequate comfort level or baseline comfort level  Outcome: Progressing  Flowsheets (Taken 10/6/2022 1916)  Verbalizes/displays adequate comfort level or baseline comfort level: Assess pain using appropriate pain scale     Problem: Safety - Adult  Goal: Free from fall injury  Outcome: Progressing       Problem: ABCDS Injury Assessment  Goal: Absence of physical injury  Outcome: Progressing

## 2022-10-07 NOTE — DISCHARGE SUMMARY
[facility]  Office: [address], DO, Freida Irvin, DO, Rama Livingston, DO, Octavia Schulte Blood, DO, Antony Lucio MD, Ashley Jenkins MD, Buster Lo MD, Dayne Edwards MD,  Leslie Diaz MD, César Sinclair MD, Saba Layer, DO, Tutu Gonzalez MD,  Pawan Chamorro MD, Ginny Seals MD, Burt Patterson, DO, Sylvie Frank MD, Grace Palma MD, Lorenzo Connors MD, Bret Simon MD, Alison Mcbride MD, Shannan Doherty MD, Dimitri Mistry, DO, Manjinder Hartley MD, Fidelina Freeman MD, Alex Mireles, CNP,  Hu Lorenzana, CNP, Craig Sever, CNP, Regis White, CNP,  Wally Jarquin, DNP, Andreina Lehman, CNP, Jonathon Rebolledo, CNP, Chidi Nixon, CNP, Christopher Shah, CNP, Robert Ponce, CNP, Génesis Roth PA-C, Jeferson Malone, CNS, Marcell Broussard, DNP, Jennifer Hernandez, CNP, Yovanny White, CNP, Lyndon Conn [address]    Discharge Summary     Patient ID: Yuly Hankins  :  1965   MRN: [id number]     ACCOUNT:  [de-identified]   Patient's PCP: No primary care provider on file. Admit Date: 10/6/2022   Discharge Date: 10/7/2022     Length of Stay: 0  Code Status:  Full Code  Admitting Physician: Ginny Seals MD  Discharge Physician: Ginny Seals MD     Active Discharge Diagnoses:     Hospital Problem Lists:  Principal Problem:    TIA (transient ischemic attack)  Active Problems:    Acquired hypothyroidism    Essential hypertension    Visual loss  Resolved Problems:    * No resolved hospital problems. *      Admission Condition:  fair     Discharged Condition: good    Hospital Stay:     Hospital Course:  Yuly Hankins is a 56 y.o. female with a past medical history of hypothyroidism and hypertension who presented to the emergency department on 10/6/2022 complaining of transient loss of vision in her left eye. The patient states that her symptoms began early this morning and resolved in the emergency department.  The patient states that she started \"seeing grey dots\" in her left periphery this morning and then suddenly lost her peripheral vision in the affected eye. In the ED, the patient was afebrile and nontoxic appearing. CT and CTA of the head and neck were negative for acute stroke or vascular abnormality. Neurology was consulted in the emergency department and recommended admission for MRI of the brain and inpatient neurology evaluation. The patient was admitted to internal medicine for further management. She improved throughout the course of admission and states that visual disturbance did not recur. MRI of the brain was done and was negative for stroke. Neurology has been consulted and will evaluate the patient prior to discharge. The patient is discharged home today (10/7) in stable condition. The etiology of the patient's presenting symptoms is likely TIA versus a primary ocular issue. The patient is instructed to take aspirin and clopidogrel for 21-days and follow-up with neurology, ophthalmology and her primary care physician as scheduled. Significant therapeutic interventions: MRI brain; neurology evaluation    Significant Diagnostic Studies:   Labs / Micro:  BMP:    Lab Results   Component Value Date/Time    GLUCOSE 115 10/07/2022 05:28 AM     10/07/2022 05:28 AM    K 3.8 10/07/2022 05:28 AM     10/07/2022 05:28 AM    CO2 28 10/07/2022 05:28 AM    ANIONGAP 10 10/07/2022 05:28 AM    BUN 17 10/07/2022 05:28 AM    CREATININE 0.81 10/07/2022 05:28 AM    CALCIUM 9.3 10/07/2022 05:28 AM    LABGLOM >60 10/07/2022 05:28 AM     Radiology:  CT HEAD WO CONTRAST    Addendum Date: 10/6/2022    ADDENDUM: Findings were discussed with Yoli Og at 12:00 pm on 10/6/2022. Result Date: 10/6/2022  No acute intracranial abnormality. Findings were sent to [address] [place] [address] at 11:56 am on 10/6/2022. XR CHEST PORTABLE    Result Date: 10/6/2022  No acute intrathoracic process.      CTA HEAD NECK W CONTRAST    Result Date: 10/6/2022  Unremarkable CTA of the head and neck. Consultations:    Consults:     Final Specialist Recommendations/Findings:   IP CONSULT TO NEUROLOGY      The patient was seen and examined on day of discharge and this discharge summary is in conjunction with any daily progress note from day of discharge. Discharge plan:     Disposition: Home    Physician Follow Up: Follow-up with your primary care physician as needed. Requiring Further Evaluation/Follow Up POST HOSPITALIZATION/Incidental Findings: None. Diet: regular diet    Activity: As tolerated    Instructions to Patient: Take aspirin, clopidogrel and atorvastatin as prescribed. Follow-up with your primary care physician as needed. Discharge Medications:      Medication List        START taking these medications      aspirin EC 81 MG EC tablet  Take 1 tablet by mouth daily for 21 days     atorvastatin 40 MG tablet  Commonly known as: LIPITOR  Take 1 tablet by mouth nightly     clopidogrel 75 MG tablet  Commonly known as: PLAVIX  Take 1 tablet by mouth daily for 21 days            CONTINUE taking these medications      levothyroxine 125 MCG tablet  Commonly known as: SYNTHROID     losartan-hydroCHLOROthiazide 100-25 MG per tablet  Commonly known as: HYZAAR               Where to Get Your Medications        These medications were sent to [facility] [address] [place] [address], [address] [place] [address]      Phone: [phone]   aspirin EC 81 MG EC tablet  atorvastatin 40 MG tablet  clopidogrel 75 MG tablet         No discharge procedures on file. Time Spent on discharge is  20 mins in patient examination, evaluation, counseling as well as medication reconciliation, prescriptions for required medications, discharge plan and follow up. Electronically signed by   Isra Mays MD  10/7/2022  3:22 PM      Thank you Dr. Nirali Guerrero primary care provider on file. for the opportunity to be involved in this patient's care.

## 2022-10-07 NOTE — CONSULTS
Neurology Consult Note - Neurology Inpatient Service    Patient Name: Ariel Frank  Patient : 1965  MRN: [id number]     Acct: [de-identified]  Date of Admission: 10/6/2022  Room/Bed: [facility]345-01  PCP: No primary care provider on file. 10/7/2022    Reason for Consult: Concern for stroke. History of Presenting Illness: The patient is 56 y.o. female -- who is being seen as a new consult for concern for stroke. Symptom: Left eye, transient hemianopsia vision. Manner of onset: Sudden. Description of event(s): The patient describes a 4-1/2-hour episode that she incurred between 7 AM to 11:30 AM involving half of her left eye. She reports that she was looking at the TV when she felt as if she has looked into a blinding light coming from her TV. Afterwards, she could see completely normal with her right eye but, the outer half of the left eye was dark and the inner half was in terms of seeing things. Short while afterwards, the lower half of the left eye became dark and, the upper half could see normal.  Afterwards her symptoms kept on fluctuating and the vision great out in the areas where it was dark. She also felt a pressure-like sensation on the left side of her head. Her symptoms resolved after about 4-1/2 hours and, she has since been normal without any untoward side effects. The patient has no prior history of migraines. She has no history of concussion, stroke, seizure, and no family history of seizures or stroke. She does have a prior history of borderline hypertension. Duration: 4-1/2 hours. Current state: Symptom-free. Severity: At the time of occurrence, symptoms were moderate to severe and were concerning to the patient for a stroke. Modifying factors: No particular precipitating or modifying factors reported. Neurology service was consulted. Thank you. Review of systems:    General: No reported chills, no fever, profound obesity.     HEENT: No reported headache, no head injury. No reported eye pain or eye congestion. No reported ear pain or ear congestion. No reported nasal congestion or nosebleed. No reported throat congestion or infection. Neck: No reported neck pain. No reported neck stiffness. Respiratory: No reported wheezing and no reported shortness of breath. Cardiac: No reported chest pain and no reported palpitations. Gastrointestinal: No reported nausea, vomiting, abdominal pain and, no reported diarrhea. Musculoskeletal: No reported arthralgia and, no reported low back pain. Endocrine: No reported thyroid disease. No reported type 1 or type 2 diabetes mellitus. Psychiatry: No reported anxiety and no reported depression. Neurological: No reported alteration in mental state. No reported weakness in extremities. No reported speech deficit. No reported seizures. No reported tongue bite or loss of bowel/bladder control. No reported stroke. Left eye visual changes-resolved. No reported vertigo. No reported hearing loss. No reported gait or ambulatory decline. Past medical History, surgical history, family history and social history were reviewed with the patient/care provider and were reviewed in the chart. Only the available information is documented below. Thank you. Past Medical History:        Diagnosis Date    Hypertension        Past Surgical History:        Procedure Laterality Date    THYROID SURGERY         Family History:       No family history on file. Social History:      TOBACCO:   has no history on file for tobacco use. ETOH:   has no history on file for alcohol use. RECREATIONAL DRUG USE:   Social History     Substance and Sexual Activity   Drug Use Not on file     The patient's medications and allergies were reviewed and the available information is documented below. Thank you. Allergies: Patient has no known allergies.     Home Medications:   Prior to Admission medications    Medication Sig Start Date End Date Taking?  Authorizing Provider   aspirin EC 81 MG EC tablet Take 1 tablet by mouth daily for 21 days 10/7/22 10/28/22 Yes Barbara Mcknight MD   atorvastatin (LIPITOR) 40 MG tablet Take 1 tablet by mouth nightly 10/7/22  Yes Barbara Mcknight MD   clopidogrel (PLAVIX) 75 MG tablet Take 1 tablet by mouth daily for 21 days 10/7/22 10/28/22 Yes Barbara Mcknight MD   levothyroxine (SYNTHROID) 125 MCG tablet Take 125 mcg by mouth Daily   Yes Historical Provider, MD   losartan-hydroCHLOROthiazide (HYZAAR) 100-25 MG per tablet Take 1 tablet by mouth daily   Yes Historical Provider, MD       Current Hospital Medications:    Current Facility-Administered Medications:     0.9 % sodium chloride bolus, 80 mL, IntraVENous, Once, Barbara Mcknight MD    sodium chloride flush 0.9 % injection 10 mL, 10 mL, IntraVENous, PRN, Barbara Mcknight MD, 10 mL at 10/06/22 1253    levothyroxine (SYNTHROID) tablet 125 mcg, 125 mcg, Oral, Daily, Barbara Mcknight MD, 125 mcg at 10/07/22 3228    aspirin EC tablet 325 mg, 325 mg, Oral, Daily, Barbara Mcknight MD, 325 mg at 10/07/22 0851    clopidogrel (PLAVIX) tablet 75 mg, 75 mg, Oral, Daily, Barbara Mcknight MD, 75 mg at 10/07/22 0851    sodium chloride flush 0.9 % injection 5-40 mL, 5-40 mL, IntraVENous, 2 times per day, Barbara Mcknight MD, 10 mL at 10/07/22 0851    sodium chloride flush 0.9 % injection 5-40 mL, 5-40 mL, IntraVENous, PRN, Barbara Mcknight MD    0.9 % sodium chloride infusion, , IntraVENous, PRN, Barbara Mcknight MD    enoxaparin (LOVENOX) injection 40 mg, 40 mg, SubCUTAneous, Daily, Barbara Mcknight MD, 40 mg at 10/07/22 0851    ondansetron (ZOFRAN-ODT) disintegrating tablet 4 mg, 4 mg, Oral, Q8H PRN **OR** ondansetron (ZOFRAN) injection 4 mg, 4 mg, IntraVENous, Q6H PRN, Barbara Mcknight MD    polyethylene glycol (GLYCOLAX) packet 17 g, 17 g, Oral, Daily PRN, Barbara Mcknight MD    acetaminophen (TYLENOL) tablet 650 mg, 650 mg, Oral, Q6H PRN **OR** acetaminophen (TYLENOL) suppository 650 mg, 650 mg, Rectal, Q6H PRN, Ginny Seals MD    atorvastatin (LIPITOR) tablet 40 mg, 40 mg, Oral, Nightly, Ginny Seals MD, 40 mg at 10/06/22 2151    losartan (COZAAR) tablet 100 mg, 100 mg, Oral, Daily, 100 mg at 10/07/22 0851 **AND** hydroCHLOROthiazide (HYDRODIURIL) tablet 25 mg, 25 mg, Oral, Daily, Ginny Seals MD, 25 mg at 10/07/22 0851     Continuous Infusions:   sodium chloride         Vital Signs:    Patient Vitals for the past 24 hrs:   BP Temp Temp src Pulse Resp SpO2 Weight   10/07/22 1227 (!) 149/87 99 °F (37.2 °C) Oral 71 16 94 % --   10/07/22 0838 (!) 144/85 98.2 °F (36.8 °C) Oral 71 16 97 % 218 lb 4.1 oz (99 kg)   10/07/22 0453 -- -- -- -- -- -- 213 lb 13.5 oz (97 kg)   10/06/22 2311 120/82 97.9 °F (36.6 °C) Oral 70 16 95 % --   10/06/22 1916 123/65 98.2 °F (36.8 °C) Oral 78 18 94 % --        Physical Examination:    General Exam:    Constitutional: The patient is profoundly obese. Cardiovascular: S1 and S2, regular rate and rhythm no overt murmurs. No carotid bruit and normal carotid pulse. Extremities: No cyanosis, no clubbing, and no edema. Ophthalmology/funduscopic exam: Unremarkable/normal.    Neurological Exam:    Dexterity: The patient is RIGHT handed. Mental Status: Alert, Awake, Oriented x 4, Normal Speech and intact comprehension - 3/3 repetition and recall. Follows 1-3 step commands. Fund of knowledge: Normal.    Attention/concentration: Normal.    Cranial Nerves: CN-II, CN-III, CN-IV, CN-V, CN-VI, CN-VII, CN-VIII, CN-IX, CN-X, CN-XI and, CN-XII are within normal limits bilaterally. Motor:   Bulk = Symmetric and within normal limits bilaterally. Tone = Symmetric and within normal limits bilaterally. Strength = 5+/5 in all 4 extremities. DTRs = Trace throughout. Plantars = Down-going bilaterally. Abnormal movements = None. Opposition = Normal in both upper extremities.    Pronator Drift = No pronator drift on the RIGHT and, no pronator drift on the LEFT side.    Sensory:   Light touch, pinprick and vibration exams are within normal limits. Coordination:   Finger to nose is normal on the right side and, on the left side. Heel to shin is normal on the right side and, on the left side. Rapid alternation movements are normal on the right side and, on the left side. Gait:  Deferred due to patient's inability to participate at this time. Romberg:  Deferred due to patient's inability to participate at this time. NIHSS score:  N/A. Results:    Labs:    Last 24hrs  Recent Results (from the past 24 hour(s))   Basic Metabolic Panel w/ Reflex to MG    Collection Time: 10/07/22  5:28 AM   Result Value Ref Range    Glucose 115 (H) 70 - 99 mg/dL    BUN 17 6 - 20 mg/dL    Creatinine 0.81 0.50 - 0.90 mg/dL    Est, Glom Filt Rate >60 >60 mL/min/1.73m2    Calcium 9.3 8.6 - 10.4 mg/dL    Sodium 139 135 - 144 mmol/L    Potassium 3.8 3.7 - 5.3 mmol/L    Chloride 101 98 - 107 mmol/L    CO2 28 20 - 31 mmol/L    Anion Gap 10 9 - 17 mmol/L     Stroke Specific Labs:  TSH:     Lab Results   Component Value Date    TSH 0.50 10/07/2022     The patient's ESR and CRP are normal.    Radiology Personal review:    MRI of the brain and CT angiogram of the head and neck are unremarkable. Neurophysiology Results:    EEG: N/A      EMG/NCS: N/A.    ASSESSMENT / PLAN / RECOMMENDATIONS :    Assessment:    Most likely ocular migraine. Rule out left eye intraocular etiology and pathology. Baseline morbid obesity. Recommendations:    Continue with current aspirin and statin therapy indefinitely. Continue current Plavix treatment for 21 days and then discontinue. Outpatient ophthalmology follow-up for eye evaluation. Outpatient neurology follow-up 4 to 6 weeks post discharge, for evaluation of potential migraines. Further recommendations, per primary team.    Disposition/Discharge issues:    Neurology services signing off. Please recall us if/as needed.     The above assessment and plan was discussed with the patient in detail and, was also discussed with the attending of the primary team.  All of the questions and concerns were addressed to their expressed understanding and satisfaction. Thank you.     Electronically signed by Karen Mishra MD on 10/7/2022 at 3:05 PM

## 2022-10-07 NOTE — PLAN OF CARE
Problem: Discharge Planning  Goal: Discharge to home or other facility with appropriate resources  10/7/2022 1624 by Asif Lucero RN  Outcome: Completed  Flowsheets (Taken 10/7/2022 0800)  Discharge to home or other facility with appropriate resources: Identify barriers to discharge with patient and caregiver  10/7/2022 0510 by Dennie Lund, RN  Outcome: Progressing     Problem: Pain  Goal: Verbalizes/displays adequate comfort level or baseline comfort level  10/7/2022 1624 by Asif Lucero RN  Outcome: Completed  10/7/2022 0510 by Dennie Lund, RN  Outcome: Progressing  Flowsheets (Taken 10/6/2022 1916)  Verbalizes/displays adequate comfort level or baseline comfort level: Assess pain using appropriate pain scale     Problem: Safety - Adult  Goal: Free from fall injury  10/7/2022 1624 by Asif Lucero RN  Outcome: Completed  10/7/2022 0510 by Dennie Lund, RN  Outcome: Progressing     Problem: ABCDS Injury Assessment  Goal: Absence of physical injury  10/7/2022 1624 by Asif Lucero RN  Outcome: Completed  10/7/2022 0510 by Dennie Lund, RN  Outcome: Progressing

## 2022-10-08 LAB
CHOLESTEROL/HDL RATIO: 4
CHOLESTEROL: 187 MG/DL
EKG ATRIAL RATE: 73 BPM
EKG P-R INTERVAL: 172 MS
EKG Q-T INTERVAL: 420 MS
EKG QRS DURATION: 88 MS
EKG QTC CALCULATION (BAZETT): 462 MS
EKG R AXIS: 135 DEGREES
EKG T AXIS: 146 DEGREES
EKG VENTRICULAR RATE: 73 BPM
HDLC SERPL-MCNC: 47 MG/DL
LDL CHOLESTEROL: 88 MG/DL (ref 0–130)
TRIGL SERPL-MCNC: 262 MG/DL

## 2022-10-09 LAB
ESTIMATED AVERAGE GLUCOSE: 117 MG/DL
HBA1C MFR BLD: 5.7 % (ref 4–6)